# Patient Record
Sex: MALE | Race: WHITE | NOT HISPANIC OR LATINO | URBAN - METROPOLITAN AREA
[De-identification: names, ages, dates, MRNs, and addresses within clinical notes are randomized per-mention and may not be internally consistent; named-entity substitution may affect disease eponyms.]

---

## 2022-07-27 ENCOUNTER — HOSPITAL ENCOUNTER (EMERGENCY)
Facility: OTHER | Age: 72
Discharge: HOME | End: 2022-07-28
Attending: EMERGENCY MEDICINE
Payer: COMMERCIAL

## 2022-07-27 DIAGNOSIS — R03.0 ELEVATED BLOOD PRESSURE READING: Primary | ICD-10-CM

## 2022-07-27 DIAGNOSIS — I10 HYPERTENSION: ICD-10-CM

## 2022-07-27 PROCEDURE — 93010 EKG 12-LEAD: ICD-10-PCS | Mod: ,,, | Performed by: INTERNAL MEDICINE

## 2022-07-27 PROCEDURE — 93010 ELECTROCARDIOGRAM REPORT: CPT | Mod: ,,, | Performed by: INTERNAL MEDICINE

## 2022-07-27 PROCEDURE — 93005 ELECTROCARDIOGRAM TRACING: CPT

## 2022-07-27 PROCEDURE — 99284 EMERGENCY DEPT VISIT MOD MDM: CPT | Mod: 25

## 2022-07-28 VITALS
HEIGHT: 72 IN | OXYGEN SATURATION: 97 % | RESPIRATION RATE: 17 BRPM | TEMPERATURE: 97 F | DIASTOLIC BLOOD PRESSURE: 111 MMHG | WEIGHT: 260 LBS | SYSTOLIC BLOOD PRESSURE: 151 MMHG | BODY MASS INDEX: 35.21 KG/M2 | HEART RATE: 72 BPM

## 2022-07-28 LAB
ANION GAP SERPL CALC-SCNC: 8 MMOL/L (ref 8–16)
BUN SERPL-MCNC: 19 MG/DL (ref 8–23)
CALCIUM SERPL-MCNC: 9 MG/DL (ref 8.7–10.5)
CHLORIDE SERPL-SCNC: 110 MMOL/L (ref 95–110)
CO2 SERPL-SCNC: 23 MMOL/L (ref 23–29)
CREAT SERPL-MCNC: 0.9 MG/DL (ref 0.5–1.4)
EST. GFR  (AFRICAN AMERICAN): >60 ML/MIN/1.73 M^2
EST. GFR  (NON AFRICAN AMERICAN): >60 ML/MIN/1.73 M^2
GLUCOSE SERPL-MCNC: 93 MG/DL (ref 70–110)
POTASSIUM SERPL-SCNC: 4.1 MMOL/L (ref 3.5–5.1)
SODIUM SERPL-SCNC: 141 MMOL/L (ref 136–145)

## 2022-07-28 PROCEDURE — 80048 BASIC METABOLIC PNL TOTAL CA: CPT | Performed by: EMERGENCY MEDICINE

## 2022-07-28 PROCEDURE — 25000003 PHARM REV CODE 250: Performed by: EMERGENCY MEDICINE

## 2022-07-28 RX ORDER — LISINOPRIL 2.5 MG/1
5 TABLET ORAL 2 TIMES DAILY
Qty: 28 TABLET | Refills: 0 | Status: SHIPPED | OUTPATIENT
Start: 2022-07-28 | End: 2022-08-04

## 2022-07-28 RX ORDER — LISINOPRIL 2.5 MG/1
2.5 TABLET ORAL DAILY
COMMUNITY
Start: 2022-07-15

## 2022-07-28 RX ORDER — METOPROLOL SUCCINATE 50 MG/1
50 TABLET, EXTENDED RELEASE ORAL DAILY
COMMUNITY
Start: 2022-07-01

## 2022-07-28 RX ORDER — ASPIRIN 81 MG/1
1 TABLET ORAL DAILY
COMMUNITY
Start: 2022-07-08 | End: 2024-07-07

## 2022-07-28 RX ORDER — LISINOPRIL 10 MG/1
10 TABLET ORAL
Status: COMPLETED | OUTPATIENT
Start: 2022-07-28 | End: 2022-07-28

## 2022-07-28 RX ORDER — DABIGATRAN ETEXILATE MESYLATE 150 MG/1
150 CAPSULE ORAL 2 TIMES DAILY
COMMUNITY
Start: 2022-04-15

## 2022-07-28 RX ORDER — ATORVASTATIN CALCIUM 80 MG/1
80 TABLET, FILM COATED ORAL
COMMUNITY
Start: 2022-07-08 | End: 2024-07-07

## 2022-07-28 RX ORDER — SILDENAFIL CITRATE 20 MG/1
TABLET ORAL
COMMUNITY
Start: 2022-07-01

## 2022-07-28 RX ADMIN — LISINOPRIL 10 MG: 10 TABLET ORAL at 12:07

## 2022-07-28 NOTE — ED PROVIDER NOTES
Encounter Date: 7/27/2022    SCRIBE #1 NOTE: I, Rosa Li, am scribing for, and in the presence of, Alma Valdes MD.       History     Chief Complaint   Patient presents with    Hypertension     X a couple days, takes 4 meds and PCP increased dosage on 1 since Friday     Sami Eid is a 71 y.o. male, with a PMHx of hypertension, A-fib, Stroke, who presents to the ED with symptomatic hypertension. Patient reports recent hypertension diagnosis 2 weeks ago on 7/15/22. He was started on Lisinopril 2.5 mg, and since then, he reports his SBP has been around 130's-40's. He routinely checks his BP daily, and noticed his SBP was elevated at 159 yesterday morning, which prompted him to double up his Lisinopril medication, taking 5 mg daily since. However, he noticed his SBP became even more elevated at 179 prior to arrival tonight, prompting him to the ED. He states he was also experiencing associated dizziness with tremors (resolved), left arm pain, tingling to his left hand and fingers, and back pain, tonight with his elevated BP. No other exacerbating or alleviating factors. He endorses medication compliance with Atorvastatin, Aspirin, Pradaxa, Metoprolol. Denies chest pain, shortness of breath, vision changes, arm weakness or numbness, or other associated symptoms. 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     The history is provided by the patient.     Review of patient's allergies indicates:  No Known Allergies  No past medical history on file.  No past surgical history on file.  No family history on file.     Review of Systems   Constitutional: Negative for chills and fever.   HENT: Negative for congestion, rhinorrhea and sore throat.    Eyes: Negative for visual disturbance.   Respiratory: Negative for cough and shortness of breath.    Cardiovascular: Negative for chest pain.        +elevated BP.   Gastrointestinal: Negative for abdominal pain,  diarrhea, nausea and vomiting.   Genitourinary: Negative for dysuria, frequency and hematuria.   Musculoskeletal: Positive for back pain.        +L arm pain.    Skin: Negative for rash.   Neurological: Positive for dizziness and tremors. Negative for weakness and headaches.        +tingling to L hand, fingers.        Physical Exam     Initial Vitals [07/27/22 2334]   BP Pulse Resp Temp SpO2   (!) 200/116 63 16 97.1 °F (36.2 °C) 97 %      MAP       --         Physical Exam    Nursing note and vitals reviewed.  Constitutional: He appears well-developed and well-nourished. He does not have a sickly appearance. No distress.   HENT:   Head: Normocephalic and atraumatic.   Right Ear: External ear normal.   Left Ear: External ear normal.   Eyes: Conjunctivae, EOM and lids are normal. Right eye exhibits no discharge. Left eye exhibits no discharge. Right conjunctiva is not injected. Right conjunctiva has no hemorrhage. Left conjunctiva is not injected. Left conjunctiva has no hemorrhage. No scleral icterus.   Neck: Phonation normal. No stridor present. No tracheal deviation present.   Normal range of motion.  Cardiovascular: Normal rate, regular rhythm and normal heart sounds. Exam reveals no friction rub.    No murmur heard.  Pulses:       Radial pulses are 2+ on the right side and 2+ on the left side.        Dorsalis pedis pulses are 2+ on the right side and 2+ on the left side.   Pulmonary/Chest: Breath sounds normal. No respiratory distress. He has no wheezes. He has no rhonchi. He has no rales.   Abdominal: Abdomen is soft. There is no abdominal tenderness. There is no rebound and no guarding.   Musculoskeletal:      Cervical back: Normal range of motion.     Neurological: He is alert and oriented to person, place, and time. He has normal strength and normal reflexes. No cranial nerve deficit or sensory deficit. GCS score is 15. GCS eye subscore is 4. GCS verbal subscore is 5. GCS motor subscore is 6.   F2N and H2S  intact.    Skin: Skin is warm.   Psychiatric: He has a normal mood and affect. His speech is normal and behavior is normal. Judgment and thought content normal. Cognition and memory are normal.         ED Course   Procedures  Labs Reviewed   BASIC METABOLIC PANEL     EKG Readings: (Independently Interpreted)   Initial Reading: No STEMI.   A-fib, rate of 66 bpm. Significant motion artifact. No significant ST or T wave changes.        Imaging Results    None          Medications   lisinopriL tablet 10 mg (10 mg Oral Given 7/28/22 0023)     Medical Decision Making:   History:   Old Medical Records: I decided to obtain old medical records.  Independently Interpreted Test(s):   I have ordered and independently interpreted EKG Reading(s) - see prior notes  Clinical Tests:   Lab Tests: Ordered and Reviewed  Medical Tests: Ordered and Reviewed    Additional MDM:   Comments: 71-year-old male with recently diagnosed hypertension presented for evaluation of elevated blood pressure at home.  He did report transient tingling in his left fingers..        Scribe Attestation:   Scribe #1: I performed the above scribed service and the documentation accurately describes the services I performed. I attest to the accuracy of the note.        ED Course as of 07/28/22 0239   Thu Jul 28, 2022   0208 On reassessment the patient is resting comfortably laying flat.  He denies any symptoms at this time.  His blood pressure has improved significantly since arrival.  His systolic blood pressure decreased from 200-1 50s after 10 of lisinopril.    I have discussed with the patient that he is currently on a very low dose of lisinopril.  I recommended that he increase it to 5 mg b.i.d. since he was previously on 2.5 mg once a day.  He is to continue taking his blood pressure daily in order to further adjust his medications.  PCP follow up upon return home for further management of his hypertension. [AA]      ED Course User Index  [AA] Alma Valdes,  MD BLAKE, Alma Valdes  , personally performed the services described in this documentation. All medical record entries made by the scribe were at my direction and in my presence. I have reviewed the chart and agree that the record reflects my personal performance and is accurate and complete.    Clinical Impression:   Final diagnoses:  [I10] Hypertension  [R03.0] Elevated blood pressure reading (Primary)          ED Disposition Condition    Discharge Stable        ED Prescriptions     Medication Sig Dispense Start Date End Date Auth. Provider    lisinopriL (PRINIVIL,ZESTRIL) 2.5 MG tablet Take 2 tablets (5 mg total) by mouth 2 (two) times a day. for 7 days 28 tablet 7/28/2022 8/4/2022 Alma Valdes MD        Follow-up Information     Follow up With Specialties Details Why Contact Info    [facility], [place] Emergency Medicine Go to  [facility] symptoms worsen [address] [facility] [address]  [phone]           Alma Valdes MD  07/28/22 5257

## 2022-07-28 NOTE — ED NOTES
Pt has been having elevated BP for the past few days. Spoke with PCP and told to double up on Lisinopril. Pt states that it is not helping and pt is also having some dizziness. No other complaints    LOC: Pt is awake alert and aware of environment, oriented X3 and speaking appropriately  Appearance: Pt is in no acute distress, Pt is well groomed and clean  Skin: skin is warm and dry with normal turgor, mucus membranes are moist and pink, skin is intact with no bruising or breakdown  Muskuloskeletal: Pt moves all extremities well, there is no obvious swelling or deformities noted, pulses are intact.  Respiratory: Airway is open and patent, respirations are spontaneous and even.  Cardiac: afib,  no edema and cap refill is <3sec  Neuro: Pt follows commands easily and has no obvious deficits